# Patient Record
Sex: FEMALE | Race: AMERICAN INDIAN OR ALASKA NATIVE
[De-identification: names, ages, dates, MRNs, and addresses within clinical notes are randomized per-mention and may not be internally consistent; named-entity substitution may affect disease eponyms.]

---

## 2020-10-01 ENCOUNTER — HOSPITAL ENCOUNTER (OUTPATIENT)
Dept: HOSPITAL 5 - PET | Age: 80
Discharge: HOME | End: 2020-10-01
Attending: INTERNAL MEDICINE
Payer: MEDICARE

## 2020-10-01 DIAGNOSIS — C50.311: Primary | ICD-10-CM

## 2020-10-01 DIAGNOSIS — E04.2: ICD-10-CM

## 2020-10-01 DIAGNOSIS — Z51.11: ICD-10-CM

## 2020-10-01 DIAGNOSIS — Z92.3: ICD-10-CM

## 2020-10-01 DIAGNOSIS — Z79.811: ICD-10-CM

## 2020-10-01 DIAGNOSIS — Z51.12: ICD-10-CM

## 2020-10-01 DIAGNOSIS — E04.1: ICD-10-CM

## 2020-10-01 DIAGNOSIS — Z79.899: ICD-10-CM

## 2020-10-01 DIAGNOSIS — E66.3: ICD-10-CM

## 2020-10-01 DIAGNOSIS — I10: ICD-10-CM

## 2020-10-01 PROCEDURE — 82962 GLUCOSE BLOOD TEST: CPT

## 2020-10-01 PROCEDURE — A9552 F18 FDG: HCPCS

## 2020-10-01 PROCEDURE — 78815 PET IMAGE W/CT SKULL-THIGH: CPT

## 2020-10-01 NOTE — PET REPORT
PET/CT



HISTORY: Z51.11.  Initial staging of right breast cancer



TECHNIQUE:  The patient's fasting blood glucose was 104.  The patient weighed 154 lbs.  The patient w
as injected with 15.8 mCi of FDG in the left hand at 0941 hours and imaging was started at 1044 hours
.  The patient was imaged from the skull base to the thighs. All CT scans at this location are perfor
med using CT dose reduction for ALARA by means of automated exposure control. Images were reviewed on
 a workstation.



COMPARISON:  None at this facility



FINDINGS:



IMAGED BRAIN: Physiologic FDG uptake.

NECK: Physiologic FDG uptake.

CHEST WALL:  Surgical clips are noted in the posterior right breast with an adjacent 2 cm masslike le
killian. Max SUV measures 4.4.

MEDIASTINUM:  Physiologic FDG uptake.

LUNGS:  Physiologic FDG uptake. No suspicious pulmonary nodule.

HEPATOBILIARY:  Physiologic FDG uptake. Right hepatic lobe SUV measures 3.9. There are multiple hypom
etabolic hypodense lesions in the liver consistent with cysts or small hemangiomas.

PANCREAS:  Physiologic FDG uptake.

SPLEEN:  Physiologic FDG uptake.

KIDNEYS/BLADDER:  Physiologic FDG uptake.

ADRENAL GLANDS:  Physiologic FDG uptake.

GI/MESENTERY: There is ill-defined soft tissue density with multiple focal calcifications in the left
 side of the mesentery measuring up to 5.5 x 2.1 cm in axial plane. The etiology of this is unclear o
n the CT images. Max SUV measures 3.0.

PELVIC VISCERA:  Physiologic FDG uptake.

LYMPH NODES:  Physiologic FDG uptake.

OSSEOUS STRUCTURES:  Physiologic FDG uptake.



ADDITIONAL FINDINGS:  None.





IMPRESSION:

 An approximate 2 cm hypermetabolic masslike lesion is identified in the posterior right breast with 
max SUV measuring 4.4. This presumably represents the primary breast cancer. This uptake could also b
e secondary to recent surgery, correlate with patient's history.

There is nonspecific soft tissue density with calcifications in the left side of the mesentery within
 the abdomen which is of uncertain etiology. There is mild hypermetabolic activity in this area measu
ring a max SUV of 3.0. I suspect this is a chronic finding and unrelated to metastatic disease.

Multiple liver cysts/hemangiomas.

No evidence for metastatic disease on PET imaging.



Signer Name: Rl Crouch Jr, MD 

Signed: 10/1/2020 12:30 PM

Workstation Name: [id number]

## 2021-05-06 ENCOUNTER — OFFICE VISIT (OUTPATIENT)
Dept: URBAN - METROPOLITAN AREA CLINIC 17 | Facility: CLINIC | Age: 81
End: 2021-05-06
Payer: MEDICARE

## 2021-05-06 DIAGNOSIS — K59.09 CHRONIC CONSTIPATION: ICD-10-CM

## 2021-05-06 DIAGNOSIS — Z86.010 PERSONAL HISTORY OF COLON POLYPS: ICD-10-CM

## 2021-05-06 DIAGNOSIS — D50.8 OTHER IRON DEFICIENCY ANEMIA: ICD-10-CM

## 2021-05-06 PROBLEM — 14760008 CONSTIPATION: Status: ACTIVE | Noted: 2021-05-06

## 2021-05-06 PROCEDURE — 99243 OFF/OP CNSLTJ NEW/EST LOW 30: CPT | Performed by: INTERNAL MEDICINE

## 2021-05-06 PROCEDURE — 99203 OFFICE O/P NEW LOW 30 MIN: CPT | Performed by: INTERNAL MEDICINE

## 2021-05-06 RX ORDER — LETROZOLE 2.5 MG/1
1 TABLET TABLET, FILM COATED ORAL ONCE A DAY
Status: ACTIVE | COMMUNITY

## 2021-05-06 RX ORDER — AMLODIPINE BESYLATE 10 MG/1
TABLET ORAL
Qty: 90 UNSPECIFIED | Status: ACTIVE | COMMUNITY

## 2021-05-06 RX ORDER — DICLOFENAC SODIUM 10 MG/G
GEL TOPICAL
Qty: 100 UNSPECIFIED | Status: ACTIVE | COMMUNITY

## 2021-05-06 RX ORDER — HYDROCHLOROTHIAZIDE 25 MG/1
TABLET ORAL
Qty: 90 UNSPECIFIED | Status: ACTIVE | COMMUNITY

## 2021-05-06 RX ORDER — NAPROXEN 500 MG/1
TABLET ORAL
Qty: 60 UNSPECIFIED | Status: ACTIVE | COMMUNITY

## 2021-05-06 RX ORDER — FLUTICASONE PROPIONATE 50 UG/1
SPRAY, METERED NASAL
Qty: 16 UNSPECIFIED | Status: ACTIVE | COMMUNITY

## 2021-05-06 RX ORDER — HYDRALAZINE HYDROCHLORIDE 25 MG/1
1 TABLET WITH FOOD TABLET, FILM COATED ORAL THREE TIMES A DAY
Status: ACTIVE | COMMUNITY

## 2021-05-06 NOTE — HPI-TODAY'S VISIT:
This is an 81 yo female  with a PMHx of breast Ca was referred by Dr. Platt for normocytic anemia.  A copy of this document will be sent to the referring provider.  The patient denies abdominal pain, weight loss, diarrhea, rectal bleeding.  Constipation is relieved with prune juice and grapes.  Her last colonoscopy 2018 revealed a precancerous polyp.

## 2021-06-17 PROBLEM — 87522002: Status: ACTIVE | Noted: 2021-05-06

## 2021-06-17 PROBLEM — 428283002 HISTORY OF POLYP OF COLON: Status: ACTIVE | Noted: 2021-05-06

## 2021-06-30 ENCOUNTER — TELEPHONE ENCOUNTER (OUTPATIENT)
Dept: URBAN - METROPOLITAN AREA CLINIC 17 | Facility: CLINIC | Age: 81
End: 2021-06-30

## 2021-06-30 ENCOUNTER — OFFICE VISIT (OUTPATIENT)
Dept: URBAN - METROPOLITAN AREA SURGERY CENTER 30 | Facility: SURGERY CENTER | Age: 81
End: 2021-06-30
Payer: MEDICARE

## 2021-06-30 DIAGNOSIS — D12.3 ADENOMA OF TRANSVERSE COLON: ICD-10-CM

## 2021-06-30 DIAGNOSIS — K31.819 ACQUIRED ARTERIOVENOUS MALFORMATION OF STOMACH: ICD-10-CM

## 2021-06-30 DIAGNOSIS — D50.9 ANEMIA, IRON DEFICIENCY: ICD-10-CM

## 2021-06-30 DIAGNOSIS — K29.60 ADENOPAPILLOMATOSIS GASTRICA: ICD-10-CM

## 2021-06-30 PROCEDURE — G8907 PT DOC NO EVENTS ON DISCHARG: HCPCS | Performed by: INTERNAL MEDICINE

## 2021-06-30 PROCEDURE — 45385 COLONOSCOPY W/LESION REMOVAL: CPT | Performed by: INTERNAL MEDICINE

## 2021-06-30 PROCEDURE — 43239 EGD BIOPSY SINGLE/MULTIPLE: CPT | Performed by: INTERNAL MEDICINE

## 2023-12-12 ENCOUNTER — CLAIMS CREATED FROM THE CLAIM WINDOW (OUTPATIENT)
Dept: URBAN - METROPOLITAN AREA CLINIC 17 | Facility: CLINIC | Age: 83
End: 2023-12-12
Payer: MEDICARE

## 2023-12-12 ENCOUNTER — DASHBOARD ENCOUNTERS (OUTPATIENT)
Age: 83
End: 2023-12-12

## 2023-12-12 VITALS
HEIGHT: 62 IN | HEART RATE: 95 BPM | TEMPERATURE: 98 F | DIASTOLIC BLOOD PRESSURE: 73 MMHG | SYSTOLIC BLOOD PRESSURE: 154 MMHG | WEIGHT: 148 LBS | BODY MASS INDEX: 27.23 KG/M2

## 2023-12-12 DIAGNOSIS — Z80.0 FAMILY HISTORY OF COLON CANCER: ICD-10-CM

## 2023-12-12 DIAGNOSIS — Z86.010 PERSONAL HISTORY OF COLON POLYPS: ICD-10-CM

## 2023-12-12 PROCEDURE — 99212 OFFICE O/P EST SF 10 MIN: CPT | Performed by: INTERNAL MEDICINE

## 2023-12-12 RX ORDER — FLUTICASONE PROPIONATE 50 UG/1
SPRAY, METERED NASAL
Qty: 16 UNSPECIFIED | Status: ACTIVE | COMMUNITY

## 2023-12-12 RX ORDER — HYDROCHLOROTHIAZIDE 25 MG/1
1 TABLET IN THE MORNING TABLET ORAL ONCE A DAY
Qty: 90 TABLET | Status: ACTIVE | COMMUNITY

## 2023-12-12 RX ORDER — LETROZOLE 2.5 MG/1
1 TABLET TABLET, FILM COATED ORAL ONCE A DAY
Status: ACTIVE | COMMUNITY

## 2023-12-12 RX ORDER — HYDRALAZINE HYDROCHLORIDE 25 MG/1
1 TABLET WITH FOOD TABLET, FILM COATED ORAL ONCE DAILY
Status: ACTIVE | COMMUNITY

## 2023-12-12 RX ORDER — NAPROXEN 500 MG/1
TABLET ORAL
Qty: 60 UNSPECIFIED | Status: ACTIVE | COMMUNITY

## 2023-12-12 RX ORDER — DICLOFENAC SODIUM 10 MG/G
GEL TOPICAL
Qty: 100 UNSPECIFIED | Status: ACTIVE | COMMUNITY

## 2023-12-12 RX ORDER — AMLODIPINE BESYLATE 10 MG/1
TABLET ORAL
Qty: 90 UNSPECIFIED | Status: ACTIVE | COMMUNITY

## 2023-12-12 NOTE — PHYSICAL EXAM RECTAL:
normal tone, no external hemorrhoids, no masses palpable, no red blood, Tenderness on BRENDON, Internal hemorrhoids present yes...

## 2023-12-12 NOTE — HPI-TODAY'S VISIT:
This is an 82-year-old female here for colon polyp surveillance.  A colonoscopy June 2021 demonstrated a tubular adenoma.  Her sister and brother had colon cancer.  She denies abdominal pain, weight loss, change in bowel habits, dark tarry stools and bright red blood per rectum.  She also denies constipation.

## 2025-08-07 ENCOUNTER — P2P PATIENT RECORD (OUTPATIENT)
Age: 85
End: 2025-08-07